# Patient Record
Sex: MALE | Race: WHITE | Employment: UNEMPLOYED | URBAN - METROPOLITAN AREA
[De-identification: names, ages, dates, MRNs, and addresses within clinical notes are randomized per-mention and may not be internally consistent; named-entity substitution may affect disease eponyms.]

---

## 2019-07-25 ENCOUNTER — APPOINTMENT (OUTPATIENT)
Dept: GENERAL RADIOLOGY | Facility: CLINIC | Age: 59
End: 2019-07-25
Payer: MEDICARE

## 2019-07-25 ENCOUNTER — HOSPITAL ENCOUNTER (EMERGENCY)
Facility: CLINIC | Age: 59
Discharge: HOME | End: 2019-07-25
Attending: SPECIALIST
Payer: MEDICARE

## 2019-07-25 VITALS
SYSTOLIC BLOOD PRESSURE: 124 MMHG | HEART RATE: 88 BPM | OXYGEN SATURATION: 95 % | DIASTOLIC BLOOD PRESSURE: 60 MMHG | TEMPERATURE: 98 F | RESPIRATION RATE: 18 BRPM

## 2019-07-25 DIAGNOSIS — S70.02XA CONTUSION OF LEFT HIP, INITIAL ENCOUNTER: Primary | ICD-10-CM

## 2019-07-25 PROCEDURE — 73502 X-RAY EXAM HIP UNI 2-3 VIEWS: CPT

## 2019-07-25 PROCEDURE — 6370000000 HC RX 637 (ALT 250 FOR IP): Performed by: SPECIALIST

## 2019-07-25 PROCEDURE — 99283 EMERGENCY DEPT VISIT LOW MDM: CPT

## 2019-07-25 RX ORDER — GUAIFENESIN 600 MG/1
600 TABLET, EXTENDED RELEASE ORAL
COMMUNITY
Start: 2019-01-28

## 2019-07-25 RX ORDER — BUDESONIDE 0.5 MG/2ML
0.5 INHALANT ORAL
COMMUNITY
Start: 2019-05-17

## 2019-07-25 RX ORDER — KETOCONAZOLE 20 MG/G
CREAM TOPICAL
COMMUNITY

## 2019-07-25 RX ORDER — ALBUTEROL SULFATE 90 UG/1
2 AEROSOL, METERED RESPIRATORY (INHALATION)
COMMUNITY

## 2019-07-25 RX ORDER — IBUPROFEN 800 MG/1
800 TABLET ORAL EVERY 8 HOURS PRN
Qty: 20 TABLET | Refills: 0 | Status: SHIPPED | OUTPATIENT
Start: 2019-07-25 | End: 2019-08-01

## 2019-07-25 RX ORDER — MECOBALAMIN 5000 MCG
1 TABLET,DISINTEGRATING ORAL
COMMUNITY

## 2019-07-25 RX ORDER — MELOXICAM 7.5 MG/1
7.5 TABLET ORAL
COMMUNITY
Start: 2019-01-28

## 2019-07-25 RX ORDER — ALBUTEROL SULFATE 2.5 MG/3ML
SOLUTION RESPIRATORY (INHALATION)
COMMUNITY
Start: 2019-01-28

## 2019-07-25 RX ORDER — IBUPROFEN 800 MG/1
800 TABLET ORAL ONCE
Status: COMPLETED | OUTPATIENT
Start: 2019-07-25 | End: 2019-07-25

## 2019-07-25 RX ORDER — LEVOTHYROXINE SODIUM 0.1 MG/1
100 TABLET ORAL
COMMUNITY
Start: 2019-01-28

## 2019-07-25 RX ORDER — MONTELUKAST SODIUM 10 MG/1
10 TABLET ORAL
COMMUNITY
Start: 2018-03-15

## 2019-07-25 RX ADMIN — IBUPROFEN 800 MG: 800 TABLET, FILM COATED ORAL at 11:29

## 2019-07-25 SDOH — HEALTH STABILITY: MENTAL HEALTH: HOW OFTEN DO YOU HAVE A DRINK CONTAINING ALCOHOL?: NEVER

## 2019-07-25 ASSESSMENT — PAIN DESCRIPTION - PROGRESSION
CLINICAL_PROGRESSION: NOT CHANGED
CLINICAL_PROGRESSION: GRADUALLY IMPROVING

## 2019-07-25 ASSESSMENT — PAIN DESCRIPTION - PAIN TYPE
TYPE: ACUTE PAIN
TYPE: ACUTE PAIN

## 2019-07-25 ASSESSMENT — PAIN - FUNCTIONAL ASSESSMENT: PAIN_FUNCTIONAL_ASSESSMENT: PREVENTS OR INTERFERES WITH MANY ACTIVE NOT PASSIVE ACTIVITIES

## 2019-07-25 ASSESSMENT — PAIN SCALES - GENERAL
PAINLEVEL_OUTOF10: 4
PAINLEVEL_OUTOF10: 3
PAINLEVEL_OUTOF10: 4

## 2019-07-25 ASSESSMENT — PAIN DESCRIPTION - ORIENTATION: ORIENTATION: LEFT

## 2019-07-25 ASSESSMENT — PAIN DESCRIPTION - LOCATION: LOCATION: HIP

## 2019-07-25 NOTE — ED PROVIDER NOTES
[facility] ED  [address] [facility]  Phone: [phone]      Pt Name: Fallon Yee  MRN: [id number]  [place] 1960  Date of evaluation: 7/25/2019      CHIEF COMPLAINT       Chief Complaint   Patient presents with    Hip Pain     left hip         HISTORY OF PRESENT ILLNESS    Fallon Yee is a 62 y.o. male who presents   Chief Complaint   Patient presents with    Hip Pain     left hip   . 15-year-old male patient with past medical history of Down syndrome and acquired intellectual disability is brought to the emergency department by caregiver from the group home with the chief complaint of left hip pain. Caregiver states that patient went to the hospital approximately 2 days ago and was transferred to the bed for a swallow study. Patient was thrashing around , struggling, tossing and turning while getting swallow study and injured his left hip. He has been reluctant to walk and is limping due to pain in the left hip since then. Patient is unable to describe the quality or quantity of pain rated he has not been given any medications for the pain today. Pain is worse with the movements, weightbearing and ablation and better with rest.  No history of chest pain, shortness of breath, nausea, vomiting, diarrhea, fever or chills. REVIEW OF SYSTEMS       Review of Systems   Constitutional: Negative for chills and fever. Musculoskeletal: Positive for arthralgias, gait problem and myalgias. Neurological: Negative for weakness and numbness. All other systems reviewed and are negative. PAST MEDICAL HISTORY    has a past medical history of Acquired intellectual disability, Dermatitis, Down syndrome, GERD (gastroesophageal reflux disease), and Thyroid disease. SURGICAL HISTORY      has no past surgical history on file.     CURRENT MEDICATIONS       Discharge Medication List as of 7/25/2019 12:10 PM      CONTINUE these medications which have NOT CHANGED

## 2022-08-15 NOTE — ED NOTES
Pt presents to the ED from group home for a c/c of left hip pain. Pt is very minimally verbal and came back to room via wheelchair. Pts caregiver states that the patient went to the hospital approx 2 days ago and pt was transferred to the bed for a swallow study and pt was \"thrashing around\" per caregiver and ever since has c/o left hip pain and reluctant to walk. Pt is able to bear weight onto his left leg and pt has positive pulse motor and sensory to left leg. Pt denies chest pain, SOB, nausea, vomiting, diarrhea, fevers, or chills. Pt resting in wheelchair in NAD, skin pink warm and dry, respirations even and unlabored and call light within reach.      Maria G Zuluaga RN  07/25/19 1267
on the discharge service for the patient. I have reviewed and made amendments to the documentation where necessary.